# Patient Record
Sex: FEMALE | Race: OTHER | Employment: OTHER | URBAN - NONMETROPOLITAN AREA
[De-identification: names, ages, dates, MRNs, and addresses within clinical notes are randomized per-mention and may not be internally consistent; named-entity substitution may affect disease eponyms.]

---

## 2022-10-24 NOTE — PATIENT DISCUSSION
Plan: Treatment Regimen. Start the following treatment(s): APPLY BACITRACIN OINTMENT TO THE INCISION SITE 3 TIMES PER DAY FOR 1 WEEK THEN STOP. Continue the following treatment(s): WARM COMPRESSES BID.

## 2023-08-23 ENCOUNTER — COMPREHENSIVE EXAM (OUTPATIENT)
Facility: LOCATION | Age: 78
End: 2023-08-23

## 2023-08-23 DIAGNOSIS — H25.813: ICD-10-CM

## 2023-08-23 DIAGNOSIS — H35.373: ICD-10-CM

## 2023-08-23 PROCEDURE — 92014 COMPRE OPH EXAM EST PT 1/>: CPT

## 2023-08-23 PROCEDURE — 92134 CPTRZ OPH DX IMG PST SGM RTA: CPT

## 2023-08-23 ASSESSMENT — KERATOMETRY
OD_K1POWER_DIOPTERS: 46.50
OS_AXISANGLE2_DEGREES: 90
OD_AXISANGLE_DEGREES: 5
OS_K1POWER_DIOPTERS: 46.00
OD_AXISANGLE2_DEGREES: 95
OS_AXISANGLE_DEGREES: 180
OS_K2POWER_DIOPTERS: 46.75
OD_K2POWER_DIOPTERS: 47.25

## 2023-08-23 ASSESSMENT — VISUAL ACUITY
OS_GLARE: 20/50
OD_SC: 20/20-1
OD_GLARE: 20/50
OS_SC: 20/20-1

## 2023-08-23 ASSESSMENT — TONOMETRY
OD_IOP_MMHG: 14
OS_IOP_MMHG: 14

## 2024-08-28 ENCOUNTER — COMPREHENSIVE EXAM (OUTPATIENT)
Facility: LOCATION | Age: 79
End: 2024-08-28

## 2024-08-28 DIAGNOSIS — H25.813: ICD-10-CM

## 2024-08-28 DIAGNOSIS — H35.373: ICD-10-CM

## 2024-08-28 PROCEDURE — 92134 CPTRZ OPH DX IMG PST SGM RTA: CPT

## 2024-08-28 PROCEDURE — 92014 COMPRE OPH EXAM EST PT 1/>: CPT

## 2024-08-28 ASSESSMENT — VISUAL ACUITY
OS_SC: 20/40-1
OD_PH: 20/50+2
OS_PH: 20/40+2
OD_SC: 20/50-2

## 2024-08-28 ASSESSMENT — KERATOMETRY
OS_AXISANGLE2_DEGREES: 83
OS_K2POWER_DIOPTERS: 47.00
OD_AXISANGLE2_DEGREES: 90
OS_AXISANGLE_DEGREES: 173
OD_K2POWER_DIOPTERS: 46.25
OD_K1POWER_DIOPTERS: 46.25
OD_AXISANGLE_DEGREES: 180
OS_K1POWER_DIOPTERS: 46.00

## 2024-08-28 ASSESSMENT — TONOMETRY
OD_IOP_MMHG: 11
OS_IOP_MMHG: 11